# Patient Record
Sex: FEMALE | Race: WHITE | NOT HISPANIC OR LATINO | URBAN - METROPOLITAN AREA
[De-identification: names, ages, dates, MRNs, and addresses within clinical notes are randomized per-mention and may not be internally consistent; named-entity substitution may affect disease eponyms.]

---

## 2021-06-03 ENCOUNTER — RECORDS - HEALTHEAST (OUTPATIENT)
Dept: ADMINISTRATIVE | Facility: CLINIC | Age: 81
End: 2021-06-03

## 2025-03-18 ENCOUNTER — TELEPHONE (OUTPATIENT)
Dept: URGENT CARE | Facility: URGENT CARE | Age: 85
End: 2025-03-18

## 2025-03-18 ENCOUNTER — OFFICE VISIT (OUTPATIENT)
Dept: URGENT CARE | Facility: URGENT CARE | Age: 85
End: 2025-03-18
Payer: COMMERCIAL

## 2025-03-18 VITALS
TEMPERATURE: 97.2 F | DIASTOLIC BLOOD PRESSURE: 85 MMHG | RESPIRATION RATE: 16 BRPM | OXYGEN SATURATION: 96 % | BODY MASS INDEX: 22.92 KG/M2 | SYSTOLIC BLOOD PRESSURE: 156 MMHG | WEIGHT: 142 LBS | HEART RATE: 89 BPM

## 2025-03-18 DIAGNOSIS — R05.1 ACUTE COUGH: Primary | ICD-10-CM

## 2025-03-18 PROCEDURE — 3077F SYST BP >= 140 MM HG: CPT

## 2025-03-18 PROCEDURE — 3079F DIAST BP 80-89 MM HG: CPT

## 2025-03-18 PROCEDURE — 99203 OFFICE O/P NEW LOW 30 MIN: CPT

## 2025-03-18 RX ORDER — AMPICILLIN TRIHYDRATE 500 MG
1 CAPSULE ORAL DAILY
COMMUNITY

## 2025-03-18 RX ORDER — BIMATOPROST 0.1 MG/ML
SOLUTION/ DROPS OPHTHALMIC
COMMUNITY
Start: 2024-10-07

## 2025-03-18 RX ORDER — BENZONATATE 200 MG/1
200 CAPSULE ORAL 3 TIMES DAILY PRN
Qty: 30 CAPSULE | Refills: 0 | Status: SHIPPED | OUTPATIENT
Start: 2025-03-18

## 2025-03-18 RX ORDER — SIMVASTATIN 20 MG
20 TABLET ORAL
COMMUNITY
Start: 2025-01-24 | End: 2026-01-24

## 2025-03-18 RX ORDER — PREDNISONE 10 MG/1
10 TABLET ORAL DAILY
Qty: 3 TABLET | Refills: 0 | Status: CANCELLED | OUTPATIENT
Start: 2025-03-18 | End: 2025-03-21

## 2025-03-18 RX ORDER — GABAPENTIN 300 MG/1
CAPSULE ORAL
COMMUNITY
Start: 2024-11-18

## 2025-03-19 NOTE — PROGRESS NOTES
URGENT CARE  Assessment & Plan   Assessment:   Lucina Zepeda is a 84 year old female who's clinical presentation today is consistent with:   1. Acute cough   - benzonatate (TESSALON) 200 MG capsule;    Plan:  Will treat patient today for acute cough supportively and symptomatically.  Patient has a dry intermittent irritative cough, patient denies any fevers, shortness of breath or pleuritic pain, patient denies any history of asthma or COPD, patient is well-appearing, afebrile, had reassuring vital signs benign physical exam, we discussed that I am not suspicious for bacterial lung etiology such as pneumonia I do not think a chest x-ray or antibiotics are indicated at this time, will treat her supportively with an antitussive but if no improvement to follow back up again for further evaluation and treatment in 7-10 days, sooner if symptoms worsen, return precautions given  No alarm signs or symptoms present   Differential Diagnoses for this patient's chief complaint that I considered include:   Bacterial vs viral etiology of cough, pneumonia, bronchitis, pulmonary embolism, pericarditis, pertussis, allergic rhinitis/PND/UACS, asthma/COPD exacerbation, postinfectious cough, inflammatory cough        DENICE Julien [facility] [place]      ______________________________________________________________________      Subjective     HPI: Lucina Zepeda  is a 84 year old  female who presents today for evaluation the following concerns:   Patient endorses a cough today which they state they have had for two weeks    Patient reports coughing is dry and irritative  denies smoking or asthma/copd history. Patient denies kavitha,   Patient denies feeling ill today, denies fevers,   Denies wheezing, shortness of breath, difficulty breathing, chest pain, denies pleuritic pain,   denies any mucus production or blood tinged sputum    Review of Systems:  Pertinent review of systems as reflected in HPI,  otherwise negative.     Objective    Physical Exam:  Vitals:    03/18/25 1848   BP: (!) 156/85   Pulse: 89   Resp: 16   Temp: 97.2  F (36.2  C)   TempSrc: Oral   SpO2: 96%   Weight: 64.4 kg (142 lb)      General: Alert and oriented, no acute distress, Vital signs reviewed: afebrile,  Psy/mental status: Cooperative, nonanxious  SKIN: Intact, no rashes  EYES: EOMs intact, PERRLA bilaterally   Conjunctiva: Clear bilaterally, no injection or erythema present  EARS: TMs intact, translucent gray in color with normal landmarks present no erythema  or bulging tympanic membrane   Canals are without swelling, however have a mild amount of cerumen, no impaction  NOSE:  mucosa moist               No frontal or maxillary sinus tenderness present bilaterally  MOUTH/THROAT: lips, tongue, & oral mucosa appear normal upon inspection                Posterior oropharynx is without erythema, edema or exudate  no dysphonia, no unilateral tonsillar edema, no uvular deviation,   no signs of peritonsillar abscess  NECK: supple, has full range of motion with no meningeal signs              No lymphadenopathy present  LUNG: normal work of breathing, good respiratory effort without retractions, good air  movement, non labored, inspection reveals normal chest expansion w/  inspiration            Lung sounds are clear to auscultation bilaterally,            No rales/rhonic/crackles wheezing noted           No cough noted or heard today       ______________________________________________________________________    I explained my diagnostic considerations and recommendations to the patient  All questions were answered.   Please see AVS for any patient instructions & handouts given.

## 2025-03-19 NOTE — PATIENT INSTRUCTIONS
Diagnosis: acute cough from viral illness     Plan:   Viral illnesses  You will recover in 1-2 weeks  But your cough may persist for longer    Start tessalon    REST   Drinking plenty of fluids,   such as water, juice, or warm soup.   Fluids thin mucus so that you can cough it up.   This helps you breathe more easily. Fluids also prevent dehydration.  Using a humidifier. This can help reduce coughing.  If you smoke, stop smoking! This include vaping     Monitor for:   Fever of 100.4 F ( 38.0 C) or higher, or as directed by your healthcare provider  Symptoms that get worse, or new symptoms  Symptoms that don't start to get better in 1 week  Trouble breathing that doesn't get better with treatment  Skin, lips, or nails that look blue, gray, or pale

## 2025-03-19 NOTE — TELEPHONE ENCOUNTER
FYI - Status Update    Who is Calling: patient    Update:  I was just in [facility] . Was wondering if a cell phone was left. Its red and a small phone .     Does caller want a call/response back: Yes     Okay to leave a detailed message?: Yes at Cell number on file:    No relevant phone numbers on file.[phone]

## 2025-03-19 NOTE — TELEPHONE ENCOUNTER
Call and left message informing patient no small red phone found in rooms (asked staff) and asked  if anyone has brought up a small red phone (no). Callback number provided for patient to callback with any questions.    Tonie Fernandez on 3/19/2025 at 12:22 PM